# Patient Record
Sex: FEMALE | Race: WHITE | NOT HISPANIC OR LATINO | Employment: FULL TIME | URBAN - METROPOLITAN AREA
[De-identification: names, ages, dates, MRNs, and addresses within clinical notes are randomized per-mention and may not be internally consistent; named-entity substitution may affect disease eponyms.]

---

## 2023-08-29 PROBLEM — R68.89 FLU-LIKE SYMPTOMS: Status: ACTIVE | Noted: 2023-08-29

## 2023-08-29 PROBLEM — J01.90 ACUTE NON-RECURRENT SINUSITIS: Status: ACTIVE | Noted: 2023-08-29

## 2023-08-29 PROBLEM — J20.0 ACUTE BRONCHITIS DUE TO MYCOPLASMA PNEUMONIAE: Status: ACTIVE | Noted: 2023-08-29

## 2023-08-29 PROBLEM — B97.89 VIRAL RESPIRATORY ILLNESS: Status: ACTIVE | Noted: 2023-08-29

## 2023-08-29 PROBLEM — M25.511 SHOULDER PAIN, BILATERAL: Status: ACTIVE | Noted: 2023-08-29

## 2023-08-29 PROBLEM — R10.9 ABDOMINAL PAIN: Status: ACTIVE | Noted: 2023-08-29

## 2023-08-29 PROBLEM — Z20.822 SUSPECTED COVID-19 VIRUS INFECTION: Status: ACTIVE | Noted: 2023-08-29

## 2023-08-29 PROBLEM — J01.00 ACUTE MAXILLARY SINUSITIS: Status: ACTIVE | Noted: 2023-08-29

## 2023-08-29 PROBLEM — M25.512 SHOULDER PAIN, BILATERAL: Status: ACTIVE | Noted: 2023-08-29

## 2023-08-29 PROBLEM — M79.10 GENERALIZED MUSCLE ACHE: Status: ACTIVE | Noted: 2023-08-29

## 2023-08-29 PROBLEM — R05.9 COUGH: Status: ACTIVE | Noted: 2023-08-29

## 2023-08-29 PROBLEM — R20.0 NUMBNESS: Status: ACTIVE | Noted: 2023-08-29

## 2023-08-29 PROBLEM — B34.9 NONSPECIFIC SYNDROME SUGGESTIVE OF VIRAL ILLNESS: Status: ACTIVE | Noted: 2023-08-29

## 2023-08-29 PROBLEM — R50.9 FEVER: Status: ACTIVE | Noted: 2023-08-29

## 2023-08-29 PROBLEM — M94.0 COSTOCHONDRITIS, ACUTE: Status: ACTIVE | Noted: 2023-08-29

## 2023-08-29 PROBLEM — J98.8 VIRAL RESPIRATORY ILLNESS: Status: ACTIVE | Noted: 2023-08-29

## 2023-08-29 PROBLEM — R76.8 ANA POSITIVE: Status: ACTIVE | Noted: 2023-08-29

## 2023-08-29 PROBLEM — R51.9 HEADACHE: Status: ACTIVE | Noted: 2023-08-29

## 2023-08-29 PROBLEM — G47.8 POOR SLEEP PATTERN: Status: ACTIVE | Noted: 2023-08-29

## 2023-08-29 PROBLEM — R53.83 FATIGUE: Status: ACTIVE | Noted: 2023-08-29

## 2023-08-29 PROBLEM — G89.29 CHRONIC PAIN: Status: ACTIVE | Noted: 2023-08-29

## 2023-08-29 RX ORDER — HYDROXYZINE HYDROCHLORIDE 25 MG/1
TABLET, FILM COATED ORAL
COMMUNITY
Start: 2022-08-29 | End: 2023-08-30 | Stop reason: ALTCHOICE

## 2023-08-30 ENCOUNTER — OFFICE VISIT (OUTPATIENT)
Dept: PRIMARY CARE | Facility: CLINIC | Age: 33
End: 2023-08-30
Payer: COMMERCIAL

## 2023-08-30 ENCOUNTER — LAB (OUTPATIENT)
Dept: LAB | Facility: LAB | Age: 33
End: 2023-08-30
Payer: COMMERCIAL

## 2023-08-30 VITALS
BODY MASS INDEX: 26.17 KG/M2 | HEIGHT: 62 IN | RESPIRATION RATE: 16 BRPM | OXYGEN SATURATION: 98 % | WEIGHT: 142.2 LBS | HEART RATE: 89 BPM | SYSTOLIC BLOOD PRESSURE: 132 MMHG | DIASTOLIC BLOOD PRESSURE: 70 MMHG

## 2023-08-30 DIAGNOSIS — Z00.00 ANNUAL PHYSICAL EXAM: ICD-10-CM

## 2023-08-30 DIAGNOSIS — Z00.00 ANNUAL PHYSICAL EXAM: Primary | ICD-10-CM

## 2023-08-30 DIAGNOSIS — Z12.4 CERVICAL CANCER SCREENING: ICD-10-CM

## 2023-08-30 PROBLEM — G89.29 CHRONIC PAIN: Status: RESOLVED | Noted: 2023-08-29 | Resolved: 2023-08-30

## 2023-08-30 PROBLEM — M79.10 GENERALIZED MUSCLE ACHE: Status: RESOLVED | Noted: 2023-08-29 | Resolved: 2023-08-30

## 2023-08-30 PROBLEM — R50.9 FEVER: Status: RESOLVED | Noted: 2023-08-29 | Resolved: 2023-08-30

## 2023-08-30 PROBLEM — J01.90 ACUTE NON-RECURRENT SINUSITIS: Status: RESOLVED | Noted: 2023-08-29 | Resolved: 2023-08-30

## 2023-08-30 PROBLEM — J98.8 VIRAL RESPIRATORY ILLNESS: Status: RESOLVED | Noted: 2023-08-29 | Resolved: 2023-08-30

## 2023-08-30 PROBLEM — M25.511 SHOULDER PAIN, BILATERAL: Status: RESOLVED | Noted: 2023-08-29 | Resolved: 2023-08-30

## 2023-08-30 PROBLEM — R51.9 HEADACHE: Status: RESOLVED | Noted: 2023-08-29 | Resolved: 2023-08-30

## 2023-08-30 PROBLEM — M25.512 SHOULDER PAIN, BILATERAL: Status: RESOLVED | Noted: 2023-08-29 | Resolved: 2023-08-30

## 2023-08-30 PROBLEM — R05.9 COUGH: Status: RESOLVED | Noted: 2023-08-29 | Resolved: 2023-08-30

## 2023-08-30 PROBLEM — J01.00 ACUTE MAXILLARY SINUSITIS: Status: RESOLVED | Noted: 2023-08-29 | Resolved: 2023-08-30

## 2023-08-30 PROBLEM — M94.0 COSTOCHONDRITIS, ACUTE: Status: RESOLVED | Noted: 2023-08-29 | Resolved: 2023-08-30

## 2023-08-30 PROBLEM — Z20.822 SUSPECTED COVID-19 VIRUS INFECTION: Status: RESOLVED | Noted: 2023-08-29 | Resolved: 2023-08-30

## 2023-08-30 PROBLEM — J20.0 ACUTE BRONCHITIS DUE TO MYCOPLASMA PNEUMONIAE: Status: RESOLVED | Noted: 2023-08-29 | Resolved: 2023-08-30

## 2023-08-30 PROBLEM — R68.89 FLU-LIKE SYMPTOMS: Status: RESOLVED | Noted: 2023-08-29 | Resolved: 2023-08-30

## 2023-08-30 PROBLEM — B97.89 VIRAL RESPIRATORY ILLNESS: Status: RESOLVED | Noted: 2023-08-29 | Resolved: 2023-08-30

## 2023-08-30 PROBLEM — R20.0 NUMBNESS: Status: RESOLVED | Noted: 2023-08-29 | Resolved: 2023-08-30

## 2023-08-30 PROBLEM — B34.9 NONSPECIFIC SYNDROME SUGGESTIVE OF VIRAL ILLNESS: Status: RESOLVED | Noted: 2023-08-29 | Resolved: 2023-08-30

## 2023-08-30 LAB
ALANINE AMINOTRANSFERASE (SGPT) (U/L) IN SER/PLAS: 10 U/L (ref 7–45)
ALBUMIN (G/DL) IN SER/PLAS: 4.4 G/DL (ref 3.4–5)
ALKALINE PHOSPHATASE (U/L) IN SER/PLAS: 58 U/L (ref 33–110)
ANION GAP IN SER/PLAS: 14 MMOL/L (ref 10–20)
ASPARTATE AMINOTRANSFERASE (SGOT) (U/L) IN SER/PLAS: 11 U/L (ref 9–39)
BASOPHILS (10*3/UL) IN BLOOD BY AUTOMATED COUNT: 0.03 X10E9/L (ref 0–0.1)
BASOPHILS/100 LEUKOCYTES IN BLOOD BY AUTOMATED COUNT: 0.5 % (ref 0–2)
BILIRUBIN TOTAL (MG/DL) IN SER/PLAS: 0.5 MG/DL (ref 0–1.2)
CALCIUM (MG/DL) IN SER/PLAS: 9.8 MG/DL (ref 8.6–10.3)
CARBON DIOXIDE, TOTAL (MMOL/L) IN SER/PLAS: 26 MMOL/L (ref 21–32)
CHLORIDE (MMOL/L) IN SER/PLAS: 104 MMOL/L (ref 98–107)
CHOLESTEROL (MG/DL) IN SER/PLAS: 192 MG/DL (ref 0–199)
CHOLESTEROL IN HDL (MG/DL) IN SER/PLAS: 49.1 MG/DL
CHOLESTEROL/HDL RATIO: 3.9
CREATININE (MG/DL) IN SER/PLAS: 0.77 MG/DL (ref 0.5–1.05)
EOSINOPHILS (10*3/UL) IN BLOOD BY AUTOMATED COUNT: 0.12 X10E9/L (ref 0–0.7)
EOSINOPHILS/100 LEUKOCYTES IN BLOOD BY AUTOMATED COUNT: 1.8 % (ref 0–6)
ERYTHROCYTE DISTRIBUTION WIDTH (RATIO) BY AUTOMATED COUNT: 12.1 % (ref 11.5–14.5)
ERYTHROCYTE MEAN CORPUSCULAR HEMOGLOBIN CONCENTRATION (G/DL) BY AUTOMATED: 33.3 G/DL (ref 32–36)
ERYTHROCYTE MEAN CORPUSCULAR VOLUME (FL) BY AUTOMATED COUNT: 90 FL (ref 80–100)
ERYTHROCYTES (10*6/UL) IN BLOOD BY AUTOMATED COUNT: 4.59 X10E12/L (ref 4–5.2)
GFR FEMALE: >90 ML/MIN/1.73M2
GLUCOSE (MG/DL) IN SER/PLAS: 78 MG/DL (ref 74–99)
HEMATOCRIT (%) IN BLOOD BY AUTOMATED COUNT: 41.2 % (ref 36–46)
HEMOGLOBIN (G/DL) IN BLOOD: 13.7 G/DL (ref 12–16)
IMMATURE GRANULOCYTES/100 LEUKOCYTES IN BLOOD BY AUTOMATED COUNT: 0.3 % (ref 0–0.9)
LDL: 128 MG/DL (ref 0–99)
LEUKOCYTES (10*3/UL) IN BLOOD BY AUTOMATED COUNT: 6.7 X10E9/L (ref 4.4–11.3)
LYMPHOCYTES (10*3/UL) IN BLOOD BY AUTOMATED COUNT: 1.95 X10E9/L (ref 1.2–4.8)
LYMPHOCYTES/100 LEUKOCYTES IN BLOOD BY AUTOMATED COUNT: 29.3 % (ref 13–44)
MONOCYTES (10*3/UL) IN BLOOD BY AUTOMATED COUNT: 0.34 X10E9/L (ref 0.1–1)
MONOCYTES/100 LEUKOCYTES IN BLOOD BY AUTOMATED COUNT: 5.1 % (ref 2–10)
NEUTROPHILS (10*3/UL) IN BLOOD BY AUTOMATED COUNT: 4.2 X10E9/L (ref 1.2–7.7)
NEUTROPHILS/100 LEUKOCYTES IN BLOOD BY AUTOMATED COUNT: 63 % (ref 40–80)
PLATELETS (10*3/UL) IN BLOOD AUTOMATED COUNT: 265 X10E9/L (ref 150–450)
POTASSIUM (MMOL/L) IN SER/PLAS: 4.5 MMOL/L (ref 3.5–5.3)
PROTEIN TOTAL: 7.4 G/DL (ref 6.4–8.2)
SODIUM (MMOL/L) IN SER/PLAS: 139 MMOL/L (ref 136–145)
THYROTROPIN (MIU/L) IN SER/PLAS BY DETECTION LIMIT <= 0.05 MIU/L: 2.19 MIU/L (ref 0.44–3.98)
TRIGLYCERIDE (MG/DL) IN SER/PLAS: 73 MG/DL (ref 0–149)
UREA NITROGEN (MG/DL) IN SER/PLAS: 16 MG/DL (ref 6–23)
VLDL: 15 MG/DL (ref 0–40)

## 2023-08-30 PROCEDURE — 80053 COMPREHEN METABOLIC PANEL: CPT

## 2023-08-30 PROCEDURE — 36415 COLL VENOUS BLD VENIPUNCTURE: CPT

## 2023-08-30 PROCEDURE — 84443 ASSAY THYROID STIM HORMONE: CPT

## 2023-08-30 PROCEDURE — 1036F TOBACCO NON-USER: CPT | Performed by: STUDENT IN AN ORGANIZED HEALTH CARE EDUCATION/TRAINING PROGRAM

## 2023-08-30 PROCEDURE — 85025 COMPLETE CBC W/AUTO DIFF WBC: CPT

## 2023-08-30 PROCEDURE — 99395 PREV VISIT EST AGE 18-39: CPT | Performed by: STUDENT IN AN ORGANIZED HEALTH CARE EDUCATION/TRAINING PROGRAM

## 2023-08-30 PROCEDURE — 80061 LIPID PANEL: CPT

## 2023-08-30 ASSESSMENT — ENCOUNTER SYMPTOMS
NUMBNESS: 0
ACTIVITY CHANGE: 0
CONSTIPATION: 0
DIARRHEA: 0
WEAKNESS: 0
WHEEZING: 0
BACK PAIN: 0
ABDOMINAL PAIN: 0
SHORTNESS OF BREATH: 0
ARTHRALGIAS: 0
DYSURIA: 0

## 2023-08-30 ASSESSMENT — PATIENT HEALTH QUESTIONNAIRE - PHQ9
2. FEELING DOWN, DEPRESSED OR HOPELESS: NOT AT ALL
SUM OF ALL RESPONSES TO PHQ9 QUESTIONS 1 AND 2: 0
1. LITTLE INTEREST OR PLEASURE IN DOING THINGS: NOT AT ALL

## 2023-08-30 NOTE — PROGRESS NOTES
History Of Present Illness  Mirna Bassett is a 33 y.o. female presents for cpe.     Patient presents for physical today.  She did not have good luck with the Atarax for her anxiety.  She has been using puzzles prior to bedtime for winding down which has been successful.     Past Medical History  She has a past medical history of Other specified health status (04/09/2015).    Surgical History  She has no past surgical history on file.     Social History  She reports that she has never smoked. She has never used smokeless tobacco. She reports current alcohol use. She reports that she does not use drugs.    Family History  Family History   Problem Relation Name Age of Onset    No Known Problems Mother      Lung cancer Father      Cardiomyopathy Father      COPD Father          Allergies  Patient has no known allergies.    Review of Systems   Constitutional:  Negative for activity change.   HENT:  Negative for congestion and dental problem.    Respiratory:  Negative for shortness of breath and wheezing.    Gastrointestinal:  Negative for abdominal pain, constipation and diarrhea.   Genitourinary:  Negative for dysuria.   Musculoskeletal:  Negative for arthralgias and back pain.   Neurological:  Negative for weakness and numbness.   All other systems reviewed and are negative.       Physical Exam  Vitals reviewed.   Constitutional:       General: She is not in acute distress.     Appearance: She is not ill-appearing.   HENT:      Right Ear: Tympanic membrane and ear canal normal.      Left Ear: Tympanic membrane and ear canal normal.      Mouth/Throat:      Mouth: Mucous membranes are moist.      Pharynx: Oropharynx is clear. No oropharyngeal exudate or posterior oropharyngeal erythema.   Eyes:      Extraocular Movements: Extraocular movements intact.      Conjunctiva/sclera: Conjunctivae normal.      Pupils: Pupils are equal, round, and reactive to light.   Neck:      Vascular: No carotid bruit.   Cardiovascular:       Rate and Rhythm: Normal rate and regular rhythm.      Heart sounds: No murmur heard.     No gallop.   Pulmonary:      Effort: Pulmonary effort is normal.      Breath sounds: Normal breath sounds. No wheezing, rhonchi or rales.   Abdominal:      General: Abdomen is flat. Bowel sounds are normal.      Palpations: Abdomen is soft.      Tenderness: There is no abdominal tenderness.   Musculoskeletal:      Cervical back: Neck supple.      Left lower leg: No edema.   Skin:     General: Skin is warm and dry.      Findings: No rash.   Neurological:      General: No focal deficit present.      Mental Status: She is alert and oriented to person, place, and time.      Gait: Gait normal.   Psychiatric:         Mood and Affect: Mood normal.         Behavior: Behavior normal.          Last Recorded Vitals  /70   Pulse 89   Resp 16   Wt 64.5 kg (142 lb 3.2 oz)   SpO2 98%     Relevant Results      No current outpatient medications on file.        Assessment/Plan   Problem List Items Addressed This Visit    None  Visit Diagnoses       Annual physical exam    -  Primary    Relevant Orders    CBC and Auto Differential    Comprehensive metabolic panel    Lipid Panel    Tsh With Reflex To Free T4 If Abnormal    Cervical cancer screening        Relevant Orders    Referral to Gynecology            This note was dictated using Dragon speech recognition software and may contain some errors that were a result of the program not accurately transcribing what was dictated.          Akila Flores, DO

## 2024-09-03 ENCOUNTER — LAB (OUTPATIENT)
Dept: LAB | Facility: LAB | Age: 34
End: 2024-09-03
Payer: COMMERCIAL

## 2024-09-03 ENCOUNTER — APPOINTMENT (OUTPATIENT)
Dept: PRIMARY CARE | Facility: CLINIC | Age: 34
End: 2024-09-03
Payer: COMMERCIAL

## 2024-09-03 VITALS
HEART RATE: 86 BPM | HEIGHT: 62 IN | BODY MASS INDEX: 27.23 KG/M2 | WEIGHT: 148 LBS | DIASTOLIC BLOOD PRESSURE: 66 MMHG | OXYGEN SATURATION: 97 % | SYSTOLIC BLOOD PRESSURE: 104 MMHG

## 2024-09-03 DIAGNOSIS — R76.8 ANA POSITIVE: ICD-10-CM

## 2024-09-03 DIAGNOSIS — Z13.29 THYROID DISORDER SCREENING: ICD-10-CM

## 2024-09-03 DIAGNOSIS — Z00.00 ANNUAL PHYSICAL EXAM: Primary | ICD-10-CM

## 2024-09-03 DIAGNOSIS — Z00.00 ANNUAL PHYSICAL EXAM: ICD-10-CM

## 2024-09-03 LAB
ALBUMIN SERPL BCP-MCNC: 4.3 G/DL (ref 3.4–5)
ALP SERPL-CCNC: 54 U/L (ref 33–110)
ALT SERPL W P-5'-P-CCNC: 9 U/L (ref 7–45)
ANION GAP SERPL CALC-SCNC: 13 MMOL/L (ref 10–20)
AST SERPL W P-5'-P-CCNC: 11 U/L (ref 9–39)
BASOPHILS # BLD AUTO: 0.03 X10*3/UL (ref 0–0.1)
BASOPHILS NFR BLD AUTO: 0.4 %
BILIRUB SERPL-MCNC: 0.7 MG/DL (ref 0–1.2)
BUN SERPL-MCNC: 14 MG/DL (ref 6–23)
CALCIUM SERPL-MCNC: 9.1 MG/DL (ref 8.6–10.3)
CHLORIDE SERPL-SCNC: 104 MMOL/L (ref 98–107)
CHOLEST SERPL-MCNC: 191 MG/DL (ref 0–199)
CHOLESTEROL/HDL RATIO: 4.3
CO2 SERPL-SCNC: 26 MMOL/L (ref 21–32)
CREAT SERPL-MCNC: 0.67 MG/DL (ref 0.5–1.05)
EGFRCR SERPLBLD CKD-EPI 2021: >90 ML/MIN/1.73M*2
EOSINOPHIL # BLD AUTO: 0.12 X10*3/UL (ref 0–0.7)
EOSINOPHIL NFR BLD AUTO: 1.7 %
ERYTHROCYTE [DISTWIDTH] IN BLOOD BY AUTOMATED COUNT: 12 % (ref 11.5–14.5)
GLUCOSE SERPL-MCNC: 83 MG/DL (ref 74–99)
HCT VFR BLD AUTO: 39.1 % (ref 36–46)
HDLC SERPL-MCNC: 44.7 MG/DL
HGB BLD-MCNC: 13.2 G/DL (ref 12–16)
IMM GRANULOCYTES # BLD AUTO: 0.01 X10*3/UL (ref 0–0.7)
IMM GRANULOCYTES NFR BLD AUTO: 0.1 % (ref 0–0.9)
LDLC SERPL CALC-MCNC: 124 MG/DL
LYMPHOCYTES # BLD AUTO: 1.81 X10*3/UL (ref 1.2–4.8)
LYMPHOCYTES NFR BLD AUTO: 26.3 %
MCH RBC QN AUTO: 29.9 PG (ref 26–34)
MCHC RBC AUTO-ENTMCNC: 33.8 G/DL (ref 32–36)
MCV RBC AUTO: 89 FL (ref 80–100)
MONOCYTES # BLD AUTO: 0.35 X10*3/UL (ref 0.1–1)
MONOCYTES NFR BLD AUTO: 5.1 %
NEUTROPHILS # BLD AUTO: 4.56 X10*3/UL (ref 1.2–7.7)
NEUTROPHILS NFR BLD AUTO: 66.4 %
NON HDL CHOLESTEROL: 146 MG/DL (ref 0–149)
NRBC BLD-RTO: 0 /100 WBCS (ref 0–0)
PLATELET # BLD AUTO: 241 X10*3/UL (ref 150–450)
POTASSIUM SERPL-SCNC: 4.4 MMOL/L (ref 3.5–5.3)
PROT SERPL-MCNC: 7.1 G/DL (ref 6.4–8.2)
RBC # BLD AUTO: 4.41 X10*6/UL (ref 4–5.2)
SODIUM SERPL-SCNC: 139 MMOL/L (ref 136–145)
TRIGL SERPL-MCNC: 112 MG/DL (ref 0–149)
TSH SERPL-ACNC: 1.74 MIU/L (ref 0.44–3.98)
VLDL: 22 MG/DL (ref 0–40)
WBC # BLD AUTO: 6.9 X10*3/UL (ref 4.4–11.3)

## 2024-09-03 PROCEDURE — 80053 COMPREHEN METABOLIC PANEL: CPT

## 2024-09-03 PROCEDURE — 1036F TOBACCO NON-USER: CPT | Performed by: STUDENT IN AN ORGANIZED HEALTH CARE EDUCATION/TRAINING PROGRAM

## 2024-09-03 PROCEDURE — 36415 COLL VENOUS BLD VENIPUNCTURE: CPT

## 2024-09-03 PROCEDURE — 85025 COMPLETE CBC W/AUTO DIFF WBC: CPT

## 2024-09-03 PROCEDURE — 84443 ASSAY THYROID STIM HORMONE: CPT

## 2024-09-03 PROCEDURE — 99395 PREV VISIT EST AGE 18-39: CPT | Performed by: STUDENT IN AN ORGANIZED HEALTH CARE EDUCATION/TRAINING PROGRAM

## 2024-09-03 PROCEDURE — 80061 LIPID PANEL: CPT

## 2024-09-03 PROCEDURE — 3008F BODY MASS INDEX DOCD: CPT | Performed by: STUDENT IN AN ORGANIZED HEALTH CARE EDUCATION/TRAINING PROGRAM

## 2024-09-03 ASSESSMENT — PATIENT HEALTH QUESTIONNAIRE - PHQ9
1. LITTLE INTEREST OR PLEASURE IN DOING THINGS: NOT AT ALL
2. FEELING DOWN, DEPRESSED OR HOPELESS: NOT AT ALL
SUM OF ALL RESPONSES TO PHQ9 QUESTIONS 1 AND 2: 0

## 2024-09-03 NOTE — PROGRESS NOTES
History Of Present Illness  Mirna Bassett is a 34 y.o. female presenting for cpe.     Continuing to alternate diarrhea and constipation.      Past Medical History  She has a past medical history of Other specified health status (04/09/2015).    Surgical History  She has no past surgical history on file.     Social History  She reports that she has never smoked. She has never used smokeless tobacco. She reports current alcohol use. She reports that she does not use drugs.    Family History  Family History   Problem Relation Name Age of Onset    No Known Problems Mother      Lung cancer Father      Cardiomyopathy Father      COPD Father          Allergies  Patient has no known allergies.    Physical Exam  Vitals reviewed.   Constitutional:       General: She is not in acute distress.     Appearance: She is not ill-appearing.   HENT:      Right Ear: Tympanic membrane and ear canal normal.      Left Ear: Tympanic membrane and ear canal normal.      Mouth/Throat:      Mouth: Mucous membranes are moist.      Pharynx: Oropharynx is clear. No oropharyngeal exudate or posterior oropharyngeal erythema.   Eyes:      Extraocular Movements: Extraocular movements intact.      Conjunctiva/sclera: Conjunctivae normal.      Pupils: Pupils are equal, round, and reactive to light.   Neck:      Vascular: No carotid bruit.   Cardiovascular:      Rate and Rhythm: Normal rate and regular rhythm.      Heart sounds: No murmur heard.     No gallop.   Pulmonary:      Effort: Pulmonary effort is normal.      Breath sounds: Normal breath sounds. No wheezing, rhonchi or rales.   Abdominal:      General: Abdomen is flat. Bowel sounds are normal.      Palpations: Abdomen is soft.      Tenderness: There is abdominal tenderness (mild with palpation on the right lower side site of fat grafting).   Musculoskeletal:      Cervical back: Neck supple.      Left lower leg: No edema.   Skin:     General: Skin is warm and dry.      Findings: No rash.    Neurological:      General: No focal deficit present.      Mental Status: She is alert and oriented to person, place, and time.      Gait: Gait normal.   Psychiatric:         Mood and Affect: Mood normal.         Behavior: Behavior normal.          Last Recorded Vitals  /66   Pulse 86   Wt 67.1 kg (148 lb)   SpO2 97%     Relevant Results  No current outpatient medications on file.       Assessment/Plan   Diagnoses and all orders for this visit:  Annual physical exam  -     CBC and Auto Differential; Future  -     Comprehensive metabolic panel; Future  -     Lipid Panel; Future  LYDIA positive  Thyroid disorder screening  -     Tsh With Reflex To Free T4 If Abnormal; Future    Annual CPE  Labs: cbc, cmp, lipid panel, tsh   Pap due next year   PE sig for mild tenderness in right lower quadrant.     Alternation of diarrhea and constipation   Trial of fodmap diet   Continue with increasing fiber     Health Maintenance   Topic Date Due    Yearly Adult Physical  Never done    HIV Screening  Never done    MMR Vaccines (1 of 1 - Standard series) Never done    Varicella Vaccines (1 of 2 - 13+ 2-dose series) Never done    Hepatitis C Screening  Never done    Hepatitis B Vaccines (1 of 3 - 19+ 3-dose series) Never done    DTaP/Tdap/Td Vaccines (1 - Tdap) Never done    Influenza Vaccine (1) Never done    COVID-19 Vaccine (3 - 2023-24 season) 09/01/2024    Cervical Cancer Screening  09/22/2024    Lipid Panel  08/30/2028    Zoster Vaccines (1 of 2) 01/21/2041    HIB Vaccines  Aged Out    IPV Vaccines  Aged Out    Hepatitis A Vaccines  Aged Out    Meningococcal Vaccine  Aged Out    Rotavirus Vaccines  Aged Out    HPV Vaccines  Aged Out    Pneumococcal Vaccine: Pediatrics (0 to 5 Years) and At-Risk Patients (6 to 64 Years)  Aged Out            Akila Flores DO

## 2024-11-18 ENCOUNTER — OFFICE VISIT (OUTPATIENT)
Dept: URGENT CARE | Age: 34
End: 2024-11-18
Payer: COMMERCIAL

## 2024-11-18 VITALS
BODY MASS INDEX: 26.16 KG/M2 | DIASTOLIC BLOOD PRESSURE: 92 MMHG | OXYGEN SATURATION: 98 % | WEIGHT: 143 LBS | SYSTOLIC BLOOD PRESSURE: 130 MMHG | HEART RATE: 88 BPM | RESPIRATION RATE: 16 BRPM | TEMPERATURE: 98 F

## 2024-11-18 DIAGNOSIS — R05.1 ACUTE COUGH: Primary | ICD-10-CM

## 2024-11-18 PROCEDURE — 1036F TOBACCO NON-USER: CPT

## 2024-11-18 PROCEDURE — 99213 OFFICE O/P EST LOW 20 MIN: CPT

## 2024-11-18 RX ORDER — AZITHROMYCIN 250 MG/1
TABLET, FILM COATED ORAL
Qty: 6 TABLET | Refills: 0 | Status: SHIPPED | OUTPATIENT
Start: 2024-11-18

## 2024-11-18 RX ORDER — BENZONATATE 200 MG/1
200 CAPSULE ORAL 3 TIMES DAILY PRN
Qty: 21 CAPSULE | Refills: 0 | Status: SHIPPED | OUTPATIENT
Start: 2024-11-18 | End: 2024-11-25

## 2024-11-18 RX ORDER — CETIRIZINE HYDROCHLORIDE, PSEUDOEPHEDRINE HYDROCHLORIDE 5; 120 MG/1; MG/1
1 TABLET, FILM COATED, EXTENDED RELEASE ORAL 2 TIMES DAILY
Qty: 28 TABLET | Refills: 0 | Status: SHIPPED | OUTPATIENT
Start: 2024-11-18 | End: 2024-12-02

## 2024-11-18 RX ORDER — ALBUTEROL SULFATE 90 UG/1
2 INHALANT RESPIRATORY (INHALATION) EVERY 6 HOURS PRN
Qty: 18 G | Refills: 0 | Status: SHIPPED | OUTPATIENT
Start: 2024-11-18 | End: 2025-11-18

## 2024-11-18 ASSESSMENT — ENCOUNTER SYMPTOMS
WEAKNESS: 0
ABDOMINAL PAIN: 0
COUGH: 1
NAUSEA: 0
NECK STIFFNESS: 0
DIARRHEA: 0
NUMBNESS: 0
WHEEZING: 0
PALPITATIONS: 0
SHORTNESS OF BREATH: 0
BACK PAIN: 0
LIGHT-HEADEDNESS: 0
SORE THROAT: 0
CHILLS: 0
CONSTIPATION: 0
FEVER: 0
VOMITING: 0
NECK PAIN: 0

## 2024-11-18 NOTE — PROGRESS NOTES
Subjective   Patient ID: Mirna Bassett is a 34 y.o. female. They present today with a chief complaint of Sore Throat (Cough and chest congestion for 2 weeks).    History of Present Illness    History provided by:  Patient   used: No        Past Medical History  Allergies as of 11/18/2024    (No Known Allergies)       (Not in a hospital admission)       Past Medical History:   Diagnosis Date    Other specified health status 04/09/2015    No pertinent past medical history       No past surgical history on file.     reports that she has never smoked. She has never used smokeless tobacco. She reports that she does not currently use alcohol. She reports that she does not use drugs.    Review of Systems  Review of Systems   Constitutional:  Negative for chills and fever.   HENT:  Positive for congestion and ear pain. Negative for sore throat.    Respiratory:  Positive for cough. Negative for shortness of breath and wheezing.    Cardiovascular:  Negative for chest pain, palpitations and leg swelling.   Gastrointestinal:  Negative for abdominal pain, constipation, diarrhea, nausea and vomiting.   Genitourinary: Negative.    Musculoskeletal:  Negative for back pain, neck pain and neck stiffness.   Neurological:  Negative for syncope, weakness, light-headedness and numbness.   All other systems reviewed and are negative.                                 Objective    Vitals:    11/18/24 0958   BP: (!) 130/92   BP Location: Right arm   Patient Position: Sitting   BP Cuff Size: Adult   Pulse: 88   Resp: 16   Temp: 36.7 °C (98 °F)   TempSrc: Oral   SpO2: 98%   Weight: 64.9 kg (143 lb)     Patient's last menstrual period was 11/04/2024 (approximate).    Physical Exam    Procedures    Point of Care Test & Imaging Results from this visit  No results found for this visit on 11/18/24.   No results found.    Diagnostic study results (if any) were reviewed by Itzel Tran PA-C.    Assessment/Plan    Allergies, medications, history, and pertinent labs/EKGs/Imaging reviewed by Itzel Tran PA-C.     Medical Decision Making  34-year-old female who has no pertinent past medical history presents with complaint of congestion, cough, ear fullness and popping, chest congestion ongoing for 2 weeks.  No recent fevers, chills, chest pain or wheezing.  Initially had fevers although these have resolved.  No tobacco, asthma, COPD.  No features concerning for reactive airway, respiratory distress, PE, sepsis, dehydration or other emergent etiology of symptoms.  She has had ear pressure although there are no exam findings of acute otitis media or externa.  Suspect bronchitis,, considered walking pneumonia as well.  Offered chest x-ray although patient prefers this trial of empiric treatment and return if symptoms do not improve. Encouraged follow-up with primary care provider.  Discussed indications for presentation to emergency department.  Discharged good condition agreeable to plan as discussed.     Orders and Diagnoses  Diagnoses and all orders for this visit:  Acute cough  -     azithromycin (Zithromax) 250 mg tablet; Take 500 mg (2 tabs) first day, take 1 tab once daily for four days following  -     albuterol 90 mcg/actuation inhaler; Inhale 2 puffs every 6 hours if needed for wheezing.  -     benzonatate (Tessalon) 200 mg capsule; Take 1 capsule (200 mg) by mouth 3 times a day as needed for cough for up to 7 days. Do not crush or chew.  -     cetirizine-pseudoephedrine (ZyrTEC-D) 5-120 mg 12 hr tablet; Take 1 tablet by mouth 2 times a day for 14 days.      Medical Admin Record      Patient disposition: Home    Electronically signed by Itzel Tran PA-C  10:14 AM

## 2025-06-01 ENCOUNTER — OFFICE VISIT (OUTPATIENT)
Dept: URGENT CARE | Age: 35
End: 2025-06-01
Payer: COMMERCIAL

## 2025-06-01 VITALS
DIASTOLIC BLOOD PRESSURE: 81 MMHG | HEART RATE: 91 BPM | BODY MASS INDEX: 24.33 KG/M2 | SYSTOLIC BLOOD PRESSURE: 115 MMHG | OXYGEN SATURATION: 98 % | TEMPERATURE: 98.1 F | WEIGHT: 133 LBS | RESPIRATION RATE: 18 BRPM

## 2025-06-01 DIAGNOSIS — H10.9 BACTERIAL CONJUNCTIVITIS: Primary | ICD-10-CM

## 2025-06-01 RX ORDER — POLYMYXIN B SULFATE AND TRIMETHOPRIM 1; 10000 MG/ML; [USP'U]/ML
1 SOLUTION OPHTHALMIC EVERY 4 HOURS
Qty: 15 ML | Refills: 0 | Status: SHIPPED | OUTPATIENT
Start: 2025-06-01 | End: 2025-06-08

## 2025-06-01 NOTE — PROGRESS NOTES
Subjective   Patient ID: Mirna Bassett is a 35 y.o. female who presents for Conjunctivitis (Pt rt eye redness, discharge since yesterday).  History of Present Illness  Mirna Bassett is a 35 year old female who presents with concerns of an eye infection.    She began experiencing eye irritation and crusting upon waking this morning. Her  noted that her eyes appeared glassy. No vision changes, only irritation in the affected eye. She denies wearing contact lenses.    Since Thursday night, she has had systemic symptoms including fever and body soreness. Her fever reached 102°F on Saturday, accompanied by general malaise. She is primarily concerned about her eye symptoms and believes her throat symptoms are resolving.    No vision changes were reported, and she confirms having a fever.    ROS is negative unless otherwise stated in HPI.       Objective     /81 (BP Location: Left arm, Patient Position: Sitting, BP Cuff Size: Large adult)   Pulse 91   Temp 36.7 °C (98.1 °F) (Oral)   Resp 18   Wt 60.3 kg (133 lb)   LMP 05/28/2025 (Exact Date)   SpO2 98%   BMI 24.33 kg/m²        VS: As documented in the triage note and EMR flowsheet from this visit was reviewed  General: Well appearing. No acute distress.   Eyes:  Extraocular movements grossly intact. No scleral icterus. Right eye injection with small amount of discharge.   Head: Atraumatic. Normocephalic.     Neck: No meningismus. No gross masses. Full movement through range of motion  ENT: Posterior oropharynx shows no erythema, exudate or edema.  Uvula is midline without edema.  No stridor or trismus  CV: Regular rhythm. No murmurs, rubs, gallops appreciated.   Resp: Clear to auscultation bilaterally. No respiratory distress.    Skin: Warm, dry. No rashes  Neuro: CN II-VII intact. A&O x3. Speech fluent. Alert. Moving all extremities. Ambulates with normal gait  Psych: Appropriate mood and affect for situation    Point of Care Test & Imaging  Results from this visit  No results found for this visit on 06/01/25.   Imaging  No results found.    Cardiology, Vascular, and Other Imaging  No other imaging results found for the past 2 days      Diagnostic study results (if any) were reviewed by Maira Ernandez PA-C.    Assessment/Plan   Allergies, medications, history, and pertinent labs/EKGs/Imaging reviewed by Maira Ernandez PA-C.     Assessment & Plan  Patient is a 35 year old female who presents with right eye erythema and discharge.  On examination, patient has injection and small amount of drainage to the right eye.  Suspect likely bacterial conjunctivitis. No contact lens use or vision changes are reported.Prescribe antibiotic eye drops. Advise warm compresses to alleviate crusting. Recommend frequent hand washing to prevent spread.  Patient informed of the diagnosis.  They are agreeable to the plan as discussed above.  Patient given the opportunity to ask questions.  All of the patient's questions were answered. Given precautions in which to seek attention in the emergency department. Discussed follow up with PCP or other appropriate clinician.      Orders and Diagnoses  Diagnoses and all orders for this visit:  Bacterial conjunctivitis  -     polymyxin B sulf-trimethoprim (Polytrim) ophthalmic solution; Administer 1 drop into both eyes every 4 hours for 7 days.        Disposition:  Home      Maira Ernandez PA-C     This medical note was created with the assistance of artificial intelligence (AI) for documentation purposes. The content has been reviewed and confirmed by the healthcare provider for accuracy and completeness. Patient consented to the use of audio recording and use of AI during their visit.

## 2025-06-03 ENCOUNTER — OFFICE VISIT (OUTPATIENT)
Dept: URGENT CARE | Age: 35
End: 2025-06-03
Payer: COMMERCIAL

## 2025-06-03 VITALS
HEART RATE: 86 BPM | OXYGEN SATURATION: 98 % | RESPIRATION RATE: 18 BRPM | SYSTOLIC BLOOD PRESSURE: 138 MMHG | BODY MASS INDEX: 24.33 KG/M2 | DIASTOLIC BLOOD PRESSURE: 89 MMHG | TEMPERATURE: 97.1 F | WEIGHT: 133 LBS

## 2025-06-03 DIAGNOSIS — B34.9 VIRAL ILLNESS: ICD-10-CM

## 2025-06-03 DIAGNOSIS — J02.9 SORE THROAT: Primary | ICD-10-CM

## 2025-06-03 LAB
POC HUMAN RHINOVIRUS PCR: NEGATIVE
POC INFLUENZA A VIRUS PCR: NEGATIVE
POC INFLUENZA B VIRUS PCR: NEGATIVE
POC RESPIRATORY SYNCYTIAL VIRUS PCR: NEGATIVE
POC STREPTOCOCCUS PYOGENES (GROUP A STREP) PCR: NEGATIVE

## 2025-06-03 ASSESSMENT — ENCOUNTER SYMPTOMS
SORE THROAT: 1
FATIGUE: 1
CHILLS: 1
HEADACHES: 1
TROUBLE SWALLOWING: 1
FEVER: 1

## 2025-06-03 NOTE — PROGRESS NOTES
Subjective   Patient ID: Mirna Bassett is a 35 y.o. female. They present today with a chief complaint of Sore Throat (5 days; Fatigue, fever, chest congestion and when breathing in feels sharp pain in back ).    History of Present Illness  See mdm      History provided by:  Patient      Past Medical History  Allergies as of 06/03/2025    (No Known Allergies)       Prescriptions Prior to Admission[1]     Medical History[2]    Surgical History[3]     reports that she has never smoked. She has never used smokeless tobacco. She reports that she does not currently use alcohol. She reports that she does not use drugs.    Review of Systems  Review of Systems   Constitutional:  Positive for chills, fatigue and fever.   HENT:  Positive for congestion, sore throat and trouble swallowing.    Neurological:  Positive for headaches.   All other systems reviewed and are negative.                                 Objective    Vitals:    06/03/25 0917   BP: 138/89   BP Location: Left arm   Patient Position: Sitting   BP Cuff Size: Adult   Pulse: 86   Resp: 18   Temp: 36.2 °C (97.1 °F)   TempSrc: Temporal   SpO2: 98%   Weight: 60.3 kg (133 lb)     Patient's last menstrual period was 05/28/2025 (exact date).    Physical Exam  Vitals and nursing note reviewed.   HENT:      Head: Normocephalic.      Right Ear: Tympanic membrane normal.      Left Ear: Tympanic membrane normal.      Nose: Nose normal.      Mouth/Throat:      Lips: Pink.      Mouth: Mucous membranes are moist.      Pharynx: Posterior oropharyngeal erythema present.      Tonsils: 2+ on the right. 2+ on the left.   Cardiovascular:      Rate and Rhythm: Normal rate and regular rhythm.   Pulmonary:      Effort: Pulmonary effort is normal.      Breath sounds: Normal breath sounds.   Lymphadenopathy:      Head:      Right side of head: Tonsillar adenopathy present.      Left side of head: Tonsillar adenopathy present.         Procedures    Point of Care Test & Imaging  Results from this visit  Results for orders placed or performed in visit on 06/03/25   POCT SPOTFIRE R/ST Panel Mini w/Strep A ([employer]) manually resulted   Result Value Ref Range    POC Group A Strep, PCR Negative Negative    POC Respiratory Syncytial Virus PCR Negative Negative    POC Influenza A Virus PCR Negative Negative    POC Influenza B Virus PCR Negative Negative    POC Human Rhinovirus PCR Negative Negative      Imaging  No results found.    Cardiology, Vascular, and Other Imaging  No other imaging results found for the past 2 days      Diagnostic study results (if any) were reviewed by Crystal L Severino, APRN-CNP.    Assessment/Plan   Allergies, medications, history, and pertinent labs/EKGs/Imaging reviewed by Crystal L Severino, APRN-CNP.     Medical Decision Making  35 yr old female with no significant medical history presents with c/o ST, fevers, HA, and fatigue for 5 days.  States her throat is what hurts the most, feels like swallowing razor blades.  Denies any sinus congestion, drainage, cough, sob, cp.  Strep spot fire testing is ran and is negative for all.  At time of discharge patient was clinically well-appearing and HDS for outpatient management. The patient and/or family was educated regarding diagnosis, supportive care, OTC and Rx medications. The patient and/or family was given the opportunity to ask questions prior to discharge.  They verbalized understanding of my discussion of the plans for treatment, expected course, indications to return to  or seek further evaluation in ED, and the need for timely follow up as directed.   They were provided with a work/school excuse if requested.      Orders and Diagnoses  Diagnoses and all orders for this visit:  Sore throat  -     POCT SPOTFIRE R/ST Panel Mini w/Strep A ([facility]) manually resulted  Viral illness  Tylenol/ibuprofen as needed for pain/discomfort/fever  Increase fluid    Medical Admin Record      Patient disposition:  Home    Electronically signed by Crystal L Severino, APRN-CNP  9:42 AM           [1] (Not in a hospital admission)  [2]   Past Medical History:  Diagnosis Date    Other specified health status 04/09/2015    No pertinent past medical history   [3] No past surgical history on file.

## 2025-06-03 NOTE — PATIENT INSTRUCTIONS
Tylenol/ibuprofen as needed for pain/discomfort/fever  Increase fluid intake  If symptoms persist or worsen, follow up with your pcp

## 2025-09-04 ENCOUNTER — APPOINTMENT (OUTPATIENT)
Dept: PRIMARY CARE | Facility: CLINIC | Age: 35
End: 2025-09-04
Payer: COMMERCIAL

## 2026-01-21 ENCOUNTER — APPOINTMENT (OUTPATIENT)
Dept: PRIMARY CARE | Facility: CLINIC | Age: 36
End: 2026-01-21
Payer: COMMERCIAL